# Patient Record
Sex: MALE | Race: WHITE | NOT HISPANIC OR LATINO | Employment: FULL TIME | URBAN - METROPOLITAN AREA
[De-identification: names, ages, dates, MRNs, and addresses within clinical notes are randomized per-mention and may not be internally consistent; named-entity substitution may affect disease eponyms.]

---

## 2022-12-17 ENCOUNTER — OFFICE VISIT (OUTPATIENT)
Dept: URGENT CARE | Facility: CLINIC | Age: 57
End: 2022-12-17

## 2022-12-17 ENCOUNTER — APPOINTMENT (OUTPATIENT)
Dept: RADIOLOGY | Facility: CLINIC | Age: 57
End: 2022-12-17

## 2022-12-17 VITALS
OXYGEN SATURATION: 95 % | SYSTOLIC BLOOD PRESSURE: 172 MMHG | HEART RATE: 111 BPM | DIASTOLIC BLOOD PRESSURE: 95 MMHG | TEMPERATURE: 99.1 F | RESPIRATION RATE: 16 BRPM

## 2022-12-17 DIAGNOSIS — J40 BRONCHITIS: ICD-10-CM

## 2022-12-17 DIAGNOSIS — J40 BRONCHITIS: Primary | ICD-10-CM

## 2022-12-17 RX ORDER — SUMATRIPTAN 100 MG/1
TABLET, FILM COATED ORAL EVERY 12 HOURS
COMMUNITY

## 2022-12-17 RX ORDER — BENZONATATE 200 MG/1
200 CAPSULE ORAL 3 TIMES DAILY PRN
Qty: 20 CAPSULE | Refills: 0 | Status: SHIPPED | OUTPATIENT
Start: 2022-12-17 | End: 2022-12-31

## 2022-12-17 RX ORDER — NEOMYCIN SULFATE, POLYMYXIN B SULFATE AND DEXAMETHASONE 3.5; 10000; 1 MG/ML; [USP'U]/ML; MG/ML
SUSPENSION/ DROPS OPHTHALMIC
COMMUNITY
Start: 2022-11-21

## 2022-12-17 RX ORDER — LISINOPRIL AND HYDROCHLOROTHIAZIDE 12.5; 1 MG/1; MG/1
1 TABLET ORAL DAILY
COMMUNITY
Start: 2022-10-07

## 2022-12-17 RX ORDER — LISINOPRIL AND HYDROCHLOROTHIAZIDE 12.5; 1 MG/1; MG/1
TABLET ORAL EVERY 24 HOURS
COMMUNITY

## 2022-12-17 RX ORDER — AZITHROMYCIN 250 MG/1
TABLET, FILM COATED ORAL
Qty: 6 TABLET | Refills: 0 | Status: SHIPPED | OUTPATIENT
Start: 2022-12-17 | End: 2022-12-21

## 2022-12-17 RX ORDER — PREDNISONE 10 MG/1
10 TABLET ORAL DAILY
Qty: 21 TABLET | Refills: 0 | Status: SHIPPED | OUTPATIENT
Start: 2022-12-17

## 2022-12-17 RX ORDER — CYCLOBENZAPRINE HCL 10 MG
TABLET ORAL EVERY 24 HOURS
COMMUNITY
Start: 2022-09-06

## 2022-12-17 RX ORDER — SILDENAFIL 100 MG/1
TABLET, FILM COATED ORAL EVERY 24 HOURS
COMMUNITY

## 2022-12-17 RX ORDER — NAPROXEN SOD/DIPHENHYDRAMINE 220MG-25MG
TABLET ORAL
COMMUNITY

## 2022-12-18 NOTE — PROGRESS NOTES
[facility]        NAME: Justen Hunter is a 65 y o  male  : 1965    MRN: [id number]  DATE: 2022  TIME: 8:14 PM    Assessment and Plan   Bronchitis [J40]  1  Bronchitis  Cov/Flu-Collected at [facility] or [facility]    XR chest pa & lateral    benzonatate (TESSALON) 200 MG capsule    predniSONE 10 mg tablet    azithromycin (ZITHROMAX) 250 mg tablet            Patient Instructions   Medications as prescribed  Tylenol  Mucinex DM  Follow up with PCP in 3-5 days  Proceed to  ER if symptoms worsen  Chief Complaint     Chief Complaint   Patient presents with   • Cough     Pt had dry cough since , but now has chest congestion and a cough  Had a fever Wed that has since subsided  History of Present Illness       Patient is a 27-year-old male with significant past medical history of hypertension presents the office complaining of cough for 3 weeks  States he had a fever of 100 this past week  He denies sore throat, congestion, SOB, CP, nausea, vomiting, abdominal pain or rashes  Review of Systems   Review of Systems   Constitutional: Positive for fatigue and fever  HENT: Negative for congestion and sore throat  Respiratory: Positive for cough  Negative for shortness of breath  Cardiovascular: Negative for chest pain and palpitations  Gastrointestinal: Negative for abdominal pain, diarrhea, nausea and vomiting  Musculoskeletal: Negative for myalgias  Neurological: Positive for light-headedness and headaches  Negative for dizziness           Current Medications       Current Outpatient Medications:   •  azithromycin (ZITHROMAX) 250 mg tablet, Take 2 tablets today then 1 tablet daily x 4 days, Disp: 6 tablet, Rfl: 0  •  benzonatate (TESSALON) 200 MG capsule, Take 1 capsule (200 mg total) by mouth 3 (three) times a day as needed for cough for up to 14 days, Disp: 20 capsule, Rfl: 0  •  lisinopril-hydrochlorothiazide (PRINZIDE,ZESTORETIC) 10-12 5 MG per tablet, every 24 hours, Disp: , Rfl:   •  predniSONE 10 mg tablet, Take 1 tablet (10 mg total) by mouth daily Take 6 on day 1, take 5 on day 2, take 4 on day 3, take 3 on day 4, take 2 on day 5, take 1 on day 6 , Disp: 21 tablet, Rfl: 0  •  cyclobenzaprine (FLEXERIL) 10 mg tablet, every 24 hours (Patient not taking: Reported on 12/17/2022), Disp: , Rfl:   •  lisinopril-hydrochlorothiazide (PRINZIDE,ZESTORETIC) 10-12 5 MG per tablet, Take 1 tablet by mouth daily, Disp: , Rfl:   •  Naproxen Sod-diphenhydrAMINE (Aleve PM) 220-25 MG TABS, , Disp: , Rfl:   •  neomycin-polymyxin-dexamethasone (MAXITROL) 0 1 % ophthalmic suspension, 3 (three) times a day (Patient not taking: Reported on 12/17/2022), Disp: , Rfl:   •  neomycin-polymyxin-dexamethasone (MAXITROL) ophthalmic suspension, PLACE 2 DROPS IN AFFECTED EYE THREE TIMES A DAY FOR 5 DAYS (Patient not taking: Reported on 12/17/2022), Disp: , Rfl:   •  sildenafil (VIAGRA) 100 mg tablet, every 24 hours (Patient not taking: Reported on 12/17/2022), Disp: , Rfl:   •  SUMAtriptan (IMITREX) 100 mg tablet, Every 12 hours (Patient not taking: Reported on 12/17/2022), Disp: , Rfl:     Current Allergies     Allergies as of 12/17/2022   • (Not on File)            The following portions of the patient's history were reviewed and updated as appropriate: allergies, current medications, past family history, past medical history, past social history, past surgical history and problem list      Past Medical History:   Diagnosis Date   • Hypertension        History reviewed  No pertinent surgical history  No family history on file  Medications have been verified  Objective   BP (!) 172/95   Pulse (!) 111   Temp 99 1 °F (37 3 °C)   Resp 16   SpO2 95%   No LMP for male patient  Physical Exam     Physical Exam  Vitals and nursing note reviewed  Constitutional:       Appearance: Normal appearance  He is well-developed     HENT:      Head: Normocephalic and atraumatic  Right Ear: Tympanic membrane, ear canal and external ear normal       Left Ear: Tympanic membrane, ear canal and external ear normal       Nose: Nose normal       Mouth/Throat:      Pharynx: Uvula midline  Eyes:      General: Lids are normal       Conjunctiva/sclera: Conjunctivae normal       Pupils: Pupils are equal, round, and reactive to light  Cardiovascular:      Rate and Rhythm: Regular rhythm  Tachycardia present  Heart sounds: Normal heart sounds  No murmur heard  No friction rub  No gallop  Pulmonary:      Effort: Pulmonary effort is normal       Breath sounds: Normal breath sounds  No stridor  No wheezing, rhonchi or rales  Musculoskeletal:         General: Normal range of motion  Cervical back: Neck supple  Skin:     General: Skin is warm and dry  Capillary Refill: Capillary refill takes less than 2 seconds  Neurological:      Mental Status: He is alert  Chest x-ray:  No evidence of acute osseous abnormalities  Radiology interpretation pending

## 2022-12-19 LAB
FLUAV RNA RESP QL NAA+PROBE: POSITIVE
FLUBV RNA RESP QL NAA+PROBE: NEGATIVE
SARS-COV-2 RNA RESP QL NAA+PROBE: NEGATIVE

## 2023-04-24 ENCOUNTER — TELEPHONE (OUTPATIENT)
Dept: GASTROENTEROLOGY | Facility: CLINIC | Age: 58
End: 2023-04-24

## 2023-04-24 NOTE — TELEPHONE ENCOUNTER
04/24/23  Screened by: Omar Medina    Referring Provider letter from [address] [place] [address]: There is no height or weight on file to calculate BMI  Has patient been referred for a routine screening Colonoscopy? yes  Is the patient between 39-70 years old? yes      Previous Colonoscopy yes   If yes:    Date: 5 yrs ago    Facility:     Reason:       SCHEDULING STAFF: If the patient is between 45yrs-49yrs, please advise patient to confirm benefits/coverage with their insurance company for a routine screening colonoscopy, some insurance carriers will only cover at [address] or older  If the patient is over 66years old, please schedule an office visit  Does the patient want to see a Gastroenterologist prior to their procedure OR are they having any GI symptoms? no    Has the patient been hospitalized or had abdominal surgery in the past 6 months? Yes    Does the patient use supplemental oxygen? no    Does the patient take Coumadin, Lovenox, Plavix, Elliquis, Xarelto, or other blood thinning medication? no    Has the patient had a stroke, cardiac event, or stent placed in the past year? no     FAILED OA    SCHEDULING STAFF: If patient answers NO to above questions, then schedule procedure  If patient answers YES to above questions, then schedule office appointment  If patient is between 45yrs - 49yrs, please advise patient that we will have to confirm benefits & coverage with their insurance company for a routine screening colonoscopy

## 2023-06-20 ENCOUNTER — TELEPHONE (OUTPATIENT)
Dept: GASTROENTEROLOGY | Facility: CLINIC | Age: 58
End: 2023-06-20

## 2023-06-20 ENCOUNTER — CONSULT (OUTPATIENT)
Dept: GASTROENTEROLOGY | Facility: CLINIC | Age: 58
End: 2023-06-20
Payer: COMMERCIAL

## 2023-06-20 VITALS
DIASTOLIC BLOOD PRESSURE: 92 MMHG | HEIGHT: 70 IN | BODY MASS INDEX: 35.5 KG/M2 | WEIGHT: 248 LBS | SYSTOLIC BLOOD PRESSURE: 150 MMHG

## 2023-06-20 DIAGNOSIS — Z90.49 HISTORY OF COLECTOMY: ICD-10-CM

## 2023-06-20 DIAGNOSIS — Z12.11 SCREENING FOR COLON CANCER: Primary | ICD-10-CM

## 2023-06-20 DIAGNOSIS — K57.90 DIVERTICULOSIS: ICD-10-CM

## 2023-06-20 PROCEDURE — 99204 OFFICE O/P NEW MOD 45 MIN: CPT | Performed by: INTERNAL MEDICINE

## 2023-06-20 RX ORDER — MELATONIN
1000 DAILY
COMMUNITY

## 2023-06-20 NOTE — TELEPHONE ENCOUNTER
Scheduled date of colonoscopy (as of today): 07/28/23  Physician performing colonoscopy: Dr Chacha Cavazos  Location of colonoscopy: [facility]  Bowel prep reviewed with patient: Miralax/Dulcolax  Instructions reviewed with patient by: Gave the folder to the patient  to take home and read  Clearances: No

## 2023-06-20 NOTE — PROGRESS NOTES
[facility] - Outpatient Consultation  Byron Chakraborty 63 y o  male MRN: [id number]  Encounter: [id number]    ASSESSMENT AND PLAN:      1  Screening for colon cancer  He is due for screening colonoscopy at this time  Procedure risks and preparation discussed in detail  - Colonoscopy; Future    2  History of colectomy  He is status post partial colectomy due to repeated episodes of diverticulitis  He does not think he has had a colonoscopy since his surgery    3  Diverticulosis  I am not sure if all of his diverticular disease was removed at the time of the colectomy, but I will see that at the time of colonoscopy      Follow up Appointment: For colonoscopy    Chief Complaint   Patient presents with   • Colonoscopy     Patient failed OA due to recent hospitalization for kidney stones       HPI:   Byron Chakraborty is a 63y o  year old male with a PMH significant for diverticulitis status post partial colectomy who presents from a consultation from PCP for screening colonoscopy  From my records, his last colonoscopy was in 2013  However since then he has had a partial colectomy due to repeated episodes of diverticulitis that were not responding to antibiotics  He did well after surgery and has had not had any issues since  He moves his bowels daily without the need to push or strain  He denies blood or mucus in his stool  He denies any family history of colon cancer      Historical Information   Past Medical History:   Diagnosis Date   • Hypertension      Past Surgical History:   Procedure Laterality Date   • ABDOMINAL SURGERY  2015   • APPENDECTOMY  1995   • COLONOSCOPY  2015   • HERNIA REPAIR  2021     Social History     Substance and Sexual Activity   Alcohol Use Not Currently   • Alcohol/week: 1 0 standard drink of alcohol   • Types: 1 Standard drinks or equivalent per week     Social History     Substance and Sexual Activity   Drug Use Never     Social History     Tobacco Use "  Smoking Status Never   Smokeless Tobacco Never     Family History   Problem Relation Age of Onset   • Arthritis Mother    • Cancer Father         Lung   • Diabetes Father    • Colon polyps Neg Hx    • Colon cancer Neg Hx        Meds/Allergies     Current Outpatient Medications:   •  Bioflavonoid Products (BIOFLEX PO)  •  cholecalciferol (VITAMIN D3) 1,000 units tablet  •  Cyanocobalamin (VITAMIN B 12 PO)  •  lisinopril-hydrochlorothiazide (PRINZIDE,ZESTORETIC) 10-12 5 MG per tablet  •  cyclobenzaprine (FLEXERIL) 10 mg tablet  •  lisinopril-hydrochlorothiazide (PRINZIDE,ZESTORETIC) 10-12 5 MG per tablet  •  Naproxen Sod-diphenhydrAMINE (Aleve PM) 220-25 MG TABS  •  neomycin-polymyxin-dexamethasone (MAXITROL) 0 1 % ophthalmic suspension  •  neomycin-polymyxin-dexamethasone (MAXITROL) ophthalmic suspension  •  predniSONE 10 mg tablet  •  sildenafil (VIAGRA) 100 mg tablet  •  SUMAtriptan (IMITREX) 100 mg tablet    Allergies   Allergen Reactions   • Aspirin Other (See Comments)     Patient has history of asprin causing blood to not clot       PHYSICAL EXAM:    Blood pressure 150/92, height 5' 10\" (1 778 m), weight 112 kg (248 lb)  Body mass index is 35 58 kg/m²  General Appearance: NAD, cooperative, alert  Eyes: Anicteric  GI:  Soft, non-tender, non-distended; normal bowel sounds; no masses, no organomegaly   Rectal: Deferred  Musculoskeletal: No edema  Skin:  No jaundice    Lab Results:   No results found for: \"WBC\", \"HGB\", \"MCV\", \"PLT\", \"INR\"  No results found for: \"NA\", \"K\", \"CL\", \"CO2\", \"ANIONGAP\", \"BUN\", \"CREATININE\", \"GLUCOSE\", \"GLUF\", \"CALCIUM\", \"CORRECTEDCA\", \"AST\", \"ALT\", \"ALKPHOS\", \"PROT\", \"BILITOT\", \"EGFR\"  No results found for: \"IRON\", \"TIBC\", \"FERRITIN\"  No results found for: \"LIPASE\"    Radiology Results:   No results found    "

## 2023-07-18 ENCOUNTER — TELEPHONE (OUTPATIENT)
Dept: GASTROENTEROLOGY | Facility: CLINIC | Age: 58
End: 2023-07-18

## 2023-07-18 NOTE — TELEPHONE ENCOUNTER
Procedure confirmed  Colonoscopy     Via: Voice mail    Instructions given: Given to Patient at Visit     Prep Given: Miralax/Dulcolax    Call the office if there are any questions.

## 2023-07-28 ENCOUNTER — HOSPITAL ENCOUNTER (OUTPATIENT)
Dept: GASTROENTEROLOGY | Facility: AMBULATORY SURGERY CENTER | Age: 58
Discharge: HOME | End: 2023-07-28
Attending: INTERNAL MEDICINE
Payer: COMMERCIAL

## 2023-07-28 ENCOUNTER — ANESTHESIA EVENT (OUTPATIENT)
Dept: GASTROENTEROLOGY | Facility: AMBULATORY SURGERY CENTER | Age: 58
End: 2023-07-28

## 2023-07-28 ENCOUNTER — ANESTHESIA (OUTPATIENT)
Dept: GASTROENTEROLOGY | Facility: AMBULATORY SURGERY CENTER | Age: 58
End: 2023-07-28

## 2023-07-28 VITALS
DIASTOLIC BLOOD PRESSURE: 78 MMHG | OXYGEN SATURATION: 95 % | HEART RATE: 74 BPM | TEMPERATURE: 98.4 F | RESPIRATION RATE: 19 BRPM | SYSTOLIC BLOOD PRESSURE: 117 MMHG

## 2023-07-28 DIAGNOSIS — Z12.11 SCREENING FOR COLON CANCER: ICD-10-CM

## 2023-07-28 PROBLEM — I10 HTN (HYPERTENSION): Status: ACTIVE | Noted: 2023-07-28

## 2023-07-28 PROCEDURE — 45380 COLONOSCOPY AND BIOPSY: CPT | Performed by: INTERNAL MEDICINE

## 2023-07-28 PROCEDURE — 88305 TISSUE EXAM BY PATHOLOGIST: CPT | Performed by: PATHOLOGY

## 2023-07-28 RX ORDER — PROPOFOL 10 MG/ML
INJECTION, EMULSION INTRAVENOUS AS NEEDED
Status: DISCONTINUED | OUTPATIENT
Start: 2023-07-28 | End: 2023-07-28

## 2023-07-28 RX ORDER — SODIUM CHLORIDE, SODIUM LACTATE, POTASSIUM CHLORIDE, CALCIUM CHLORIDE 600; 310; 30; 20 MG/100ML; MG/100ML; MG/100ML; MG/100ML
50 INJECTION, SOLUTION INTRAVENOUS CONTINUOUS
Status: DISCONTINUED | OUTPATIENT
Start: 2023-07-28 | End: 2023-08-01 | Stop reason: HOSPADM

## 2023-07-28 RX ADMIN — PROPOFOL 50 MG: 10 INJECTION, EMULSION INTRAVENOUS at 08:05

## 2023-07-28 RX ADMIN — SODIUM CHLORIDE, SODIUM LACTATE, POTASSIUM CHLORIDE, CALCIUM CHLORIDE 50 ML/HR: 600; 310; 30; 20 INJECTION, SOLUTION INTRAVENOUS at 07:45

## 2023-07-28 RX ADMIN — SODIUM CHLORIDE, SODIUM LACTATE, POTASSIUM CHLORIDE, CALCIUM CHLORIDE: 600; 310; 30; 20 INJECTION, SOLUTION INTRAVENOUS at 08:09

## 2023-07-28 RX ADMIN — PROPOFOL 100 MG: 10 INJECTION, EMULSION INTRAVENOUS at 07:56

## 2023-07-28 RX ADMIN — PROPOFOL 100 MG: 10 INJECTION, EMULSION INTRAVENOUS at 08:01

## 2023-07-28 NOTE — ANESTHESIA POSTPROCEDURE EVALUATION
Post-Op Assessment Note    CV Status:  Stable  Pain Score: 0    Pain management: adequate     Mental Status:  Alert and awake   Hydration Status:  Euvolemic   PONV Controlled:  Controlled   Airway Patency:  Patent      Post Op Vitals Reviewed: Yes      Staff: CRNA         No notable events documented.     BP   119/67   Temp   98   Pulse  79   Resp   16   SpO2   98

## 2023-07-28 NOTE — ANESTHESIA PREPROCEDURE EVALUATION
Procedure:  COLONOSCOPY    Relevant Problems   CARDIO   (+) HTN (hypertension)      migraines  Physical Exam    Airway    Mallampati score: II  TM Distance: <3 FB  Neck ROM: full     Dental   Comment: None loose, No notable dental hx     Cardiovascular      Pulmonary      Other Findings        Anesthesia Plan  ASA Score- 2     Anesthesia Type- IV sedation with anesthesia with ASA Monitors. Additional Monitors:   Airway Plan:     Comment: Last of PO bowel prep: 03:00    Patient educated on the possibility for awareness under sedation and of the possibility of airway intervention in the event of an airway or procedural emergency  . Plan Factors-Exercise tolerance (METS): >4 METS. Chart reviewed. Patient summary reviewed. Patient is not a current smoker. Induction- intravenous. Postoperative Plan-     Informed Consent- Anesthetic plan and risks discussed with patient. I personally reviewed this patient with the CRNA. Discussed and agreed on the Anesthesia Plan with the CRNA. Medardo Thompson

## 2023-07-28 NOTE — H&P
History and Physical - [facility]  Eder Peralta 63 y.o. male MRN: [id number]    HPI: Eder Peralta is a 63 y.o. male who presents for CRC screening colonoscopy. He has a partial colectomy due to diverticular disease. No complaints    REVIEW OF SYSTEMS: Per the HPI, and otherwise unremarkable.     Historical Information   Past Medical History:   Diagnosis Date   • Hypertension      Past Surgical History:   Procedure Laterality Date   • ABDOMINAL SURGERY  2015   • APPENDECTOMY  1995   • COLONOSCOPY  2015   • HERNIA REPAIR  2021     Social History   Social History     Substance and Sexual Activity   Alcohol Use Not Currently   • Alcohol/week: 1.0 standard drink of alcohol   • Types: 1 Standard drinks or equivalent per week     Social History     Substance and Sexual Activity   Drug Use Never     Social History     Tobacco Use   Smoking Status Never   Smokeless Tobacco Never     Family History   Problem Relation Age of Onset   • Hyperlipidemia Mother    • Hypertension Mother    • Arthritis Mother    • Cancer Father         Lung   • Diabetes Father    • Hypertension Sister    • Hyperlipidemia Sister    • Colon polyps Sister    • Colon cancer Neg Hx        Meds/Allergies       Current Outpatient Medications:   •  Bioflavonoid Products (BIOFLEX PO)  •  cholecalciferol (VITAMIN D3) 1,000 units tablet  •  Cyanocobalamin (VITAMIN B 12 PO)  •  lisinopril-hydrochlorothiazide (PRINZIDE,ZESTORETIC) 10-12.5 MG per tablet  •  cyclobenzaprine (FLEXERIL) 10 mg tablet  •  lisinopril-hydrochlorothiazide (PRINZIDE,ZESTORETIC) 10-12.5 MG per tablet  •  Naproxen Sod-diphenhydrAMINE (Aleve PM) 220-25 MG TABS  •  neomycin-polymyxin-dexamethasone (MAXITROL) 0.1 % ophthalmic suspension  •  neomycin-polymyxin-dexamethasone (MAXITROL) ophthalmic suspension  •  predniSONE 10 mg tablet  •  sildenafil (VIAGRA) 100 mg tablet  •  SUMAtriptan (IMITREX) 100 mg tablet    Current Facility-Administered Medications:   •  lactated To imaging.   ringers infusion, 50 mL/hr, Intravenous, Continuous    Allergies   Allergen Reactions   • Aspirin Other (See Comments)     Patient has history of asprin causing blood to not clot       Objective     /83   Pulse 75   Temp 98.4 °F (36.9 °C) (Temporal)   Resp 21   SpO2 97%     PHYSICAL EXAM    Gen: NAD AAOx3  Head: Normocephalic, Atraumatic  CV: S1S2 RRR no m/r/g  CHEST: Clear b/l no c/r/w  ABD: soft, +BS NT/ND  EXT: no edema    ASSESSMENT/PLAN:  This is a 63y.o. year old male here for CRC screening colonoscopy, and he is stable and optimized for his procedure.

## 2023-08-01 PROCEDURE — 88305 TISSUE EXAM BY PATHOLOGIST: CPT | Performed by: PATHOLOGY

## 2025-08-18 ENCOUNTER — HOSPITAL ENCOUNTER (OUTPATIENT)
Age: 60
Discharge: HOME | End: 2025-08-18
Attending: UROLOGY
Payer: COMMERCIAL

## 2025-08-18 DIAGNOSIS — N21.0 CALCULUS IN BLADDER: ICD-10-CM

## 2025-08-18 DIAGNOSIS — R31.0 GROSS HEMATURIA: ICD-10-CM

## 2025-08-18 PROCEDURE — 76775 US EXAM ABDO BACK WALL LIM: CPT
